# Patient Record
Sex: FEMALE | Race: BLACK OR AFRICAN AMERICAN | NOT HISPANIC OR LATINO | URBAN - METROPOLITAN AREA
[De-identification: names, ages, dates, MRNs, and addresses within clinical notes are randomized per-mention and may not be internally consistent; named-entity substitution may affect disease eponyms.]

---

## 2020-07-13 ENCOUNTER — OFFICE VISIT (OUTPATIENT)
Dept: URBAN - METROPOLITAN AREA CLINIC 27 | Facility: CLINIC | Age: 65
End: 2020-07-13

## 2020-07-13 PROBLEM — 414916001 OBESITY: Status: ACTIVE | Noted: 2020-07-13

## 2020-07-31 ENCOUNTER — OFFICE VISIT (OUTPATIENT)
Dept: URBAN - METROPOLITAN AREA SURGERY CENTER 7 | Facility: SURGERY CENTER | Age: 65
End: 2020-07-31

## 2020-12-07 ENCOUNTER — OFFICE VISIT (OUTPATIENT)
Dept: URBAN - METROPOLITAN AREA CLINIC 27 | Facility: CLINIC | Age: 65
End: 2020-12-07

## 2021-09-28 ENCOUNTER — OFFICE VISIT (OUTPATIENT)
Dept: URBAN - METROPOLITAN AREA CLINIC 27 | Facility: CLINIC | Age: 66
End: 2021-09-28

## 2022-04-30 ENCOUNTER — TELEPHONE ENCOUNTER (OUTPATIENT)
Dept: URBAN - METROPOLITAN AREA CLINIC 121 | Facility: CLINIC | Age: 67
End: 2022-04-30

## 2022-04-30 RX ORDER — DEXLANSOPRAZOLE 60 MG/1
1 CAPSULE PO QD CAPSULE, DELAYED RELEASE ORAL
OUTPATIENT
Start: 2015-11-04

## 2022-04-30 RX ORDER — ATENOLOL 25 MG/1
TABLET ORAL
OUTPATIENT
Start: 2015-08-18

## 2022-04-30 RX ORDER — OMEPRAZOLE 40 MG/1
CAPSULE, DELAYED RELEASE ORAL
OUTPATIENT
Start: 2015-08-18 | End: 2016-02-15

## 2022-04-30 RX ORDER — DEXLANSOPRAZOLE 60 MG/1
1 CAPSULE PO QD CAPSULE, DELAYED RELEASE ORAL
OUTPATIENT
Start: 2015-11-04 | End: 2020-12-07

## 2022-04-30 RX ORDER — OMEPRAZOLE 40 MG/1
CAPSULE, DELAYED RELEASE ORAL
OUTPATIENT
Start: 2015-08-18

## 2022-05-01 ENCOUNTER — TELEPHONE ENCOUNTER (OUTPATIENT)
Dept: URBAN - METROPOLITAN AREA CLINIC 121 | Facility: CLINIC | Age: 67
End: 2022-05-01

## 2022-05-01 RX ORDER — DICYCLOMINE HYDROCHLORIDE 20 MG/1
TAKE 1 TABLET PO Q8H PRN ABD PAIN TABLET ORAL
Status: ACTIVE | COMMUNITY
Start: 2020-01-31

## 2022-05-01 RX ORDER — ATENOLOL 25 MG/1
TABLET ORAL
Status: ACTIVE | COMMUNITY
Start: 2015-08-18

## 2022-05-01 RX ORDER — CURCUMIN 100 %
POWDER (GRAM) MISCELLANEOUS
Status: ACTIVE | COMMUNITY
Start: 2015-08-18

## 2023-05-03 ENCOUNTER — WEB ENCOUNTER (OUTPATIENT)
Dept: URBAN - METROPOLITAN AREA CLINIC 27 | Facility: CLINIC | Age: 68
End: 2023-05-03

## 2023-05-03 ENCOUNTER — OFFICE VISIT (OUTPATIENT)
Dept: URBAN - METROPOLITAN AREA CLINIC 27 | Facility: CLINIC | Age: 68
End: 2023-05-03
Payer: MEDICARE

## 2023-05-03 VITALS
HEART RATE: 53 BPM | BODY MASS INDEX: 32.62 KG/M2 | HEIGHT: 65 IN | WEIGHT: 195.8 LBS | RESPIRATION RATE: 17 BRPM | SYSTOLIC BLOOD PRESSURE: 115 MMHG | DIASTOLIC BLOOD PRESSURE: 68 MMHG

## 2023-05-03 DIAGNOSIS — K21.9 GASTRO-ESOPHAGEAL REFLUX DISEASE WITHOUT ESOPHAGITIS: ICD-10-CM

## 2023-05-03 DIAGNOSIS — R10.32 LEFT LOWER QUADRANT PAIN: ICD-10-CM

## 2023-05-03 PROBLEM — 301716002 LEFT LOWER QUADRANT PAIN: Status: ACTIVE | Noted: 2020-01-31

## 2023-05-03 PROCEDURE — 99213 OFFICE O/P EST LOW 20 MIN: CPT | Performed by: INTERNAL MEDICINE

## 2023-05-03 RX ORDER — DICYCLOMINE HYDROCHLORIDE 20 MG/1
TAKE 1 TABLET PO Q8H PRN ABD PAIN TABLET ORAL
Status: ACTIVE | COMMUNITY
Start: 2020-01-31

## 2023-05-03 RX ORDER — CURCUMIN 100 %
POWDER (GRAM) MISCELLANEOUS
Status: ACTIVE | COMMUNITY
Start: 2015-08-18

## 2023-05-03 RX ORDER — ATENOLOL 25 MG/1
TABLET ORAL
Status: ACTIVE | COMMUNITY
Start: 2015-08-18

## 2023-05-03 NOTE — HPI-TODAY'S VISIT:
68-year-old female here for abdominal pain.  She has had intermittent left lower quadrant pain for several years.  Pain is improved with dicyclomine.  She had an episode of pain about 4 to 6 weeks ago that seemed worse than normal.  Pain was improved with dicyclomine.  Pain resolved about 3 weeks ago.  No fevers or chills.  Bowel movements were irregular during the episode.  Last colonoscopy was 2020 which showed 1 small polyp and diverticulosis of the sigmoid colon.

## 2023-05-23 ENCOUNTER — OFFICE VISIT (OUTPATIENT)
Dept: URBAN - METROPOLITAN AREA CLINIC 27 | Facility: CLINIC | Age: 68
End: 2023-05-23
Payer: MEDICARE

## 2023-05-23 VITALS
SYSTOLIC BLOOD PRESSURE: 135 MMHG | DIASTOLIC BLOOD PRESSURE: 79 MMHG | HEIGHT: 65 IN | HEART RATE: 66 BPM | BODY MASS INDEX: 32.82 KG/M2 | WEIGHT: 197 LBS

## 2023-05-23 DIAGNOSIS — R10.32 LEFT LOWER QUADRANT PAIN: ICD-10-CM

## 2023-05-23 DIAGNOSIS — R10.84 GENERALIZED ABDOMINAL DISCOMFORT: ICD-10-CM

## 2023-05-23 PROCEDURE — 99213 OFFICE O/P EST LOW 20 MIN: CPT | Performed by: INTERNAL MEDICINE

## 2023-05-23 RX ORDER — ATENOLOL 25 MG/1
TABLET ORAL
Status: ACTIVE | COMMUNITY
Start: 2015-08-18

## 2023-05-23 RX ORDER — CURCUMIN 100 %
POWDER (GRAM) MISCELLANEOUS
Status: ACTIVE | COMMUNITY
Start: 2015-08-18

## 2023-05-23 RX ORDER — DICYCLOMINE HYDROCHLORIDE 20 MG/1
TAKE 1 TABLET PO Q8H PRN ABD PAIN TABLET ORAL
Status: ACTIVE | COMMUNITY
Start: 2020-01-31

## 2023-05-23 NOTE — HPI-TODAY'S VISIT:
68-year-old female here for concern for possible H. pylori.  She was noted on recent labs to have low ferritin and B12 per patient, records not currently available.  She does have intermittent bloating and abdominal discomfort.  Denies melena, rectal bleeding, nausea, vomiting.

## 2023-05-24 ENCOUNTER — TELEPHONE ENCOUNTER (OUTPATIENT)
Dept: URBAN - METROPOLITAN AREA CLINIC 27 | Facility: CLINIC | Age: 68
End: 2023-05-24

## 2023-05-26 LAB
H PYLORI BREATH TEST: DETECTED
H. PYLORI BREATH TEST: DETECTED
INTERPRETATION: DETECTED

## 2023-06-05 ENCOUNTER — TELEPHONE ENCOUNTER (OUTPATIENT)
Dept: URBAN - METROPOLITAN AREA CLINIC 27 | Facility: CLINIC | Age: 68
End: 2023-06-05

## 2023-06-06 ENCOUNTER — TELEPHONE ENCOUNTER (OUTPATIENT)
Dept: URBAN - METROPOLITAN AREA CLINIC 27 | Facility: CLINIC | Age: 68
End: 2023-06-06

## 2023-06-06 RX ORDER — OMEPRAZOLE 20 MG/1
1 CAPSULE CAPSULE, DELAYED RELEASE ORAL TWICE DAILY
Qty: 28 | Refills: 0 | OUTPATIENT
Start: 2023-06-08

## 2023-06-06 RX ORDER — METRONIDAZOLE 500 MG/1
1 TABLET TABLET ORAL TWICE A DAY
Qty: 28 TABLET | Refills: 0 | OUTPATIENT
Start: 2023-06-08 | End: 2023-06-22

## 2023-06-06 RX ORDER — CLARITHROMYCIN 500 MG/1
1 TABLET TABLET, FILM COATED ORAL
Qty: 28 TABLET | Refills: 0 | OUTPATIENT
Start: 2023-06-08 | End: 2023-06-22

## 2023-06-12 ENCOUNTER — TELEPHONE ENCOUNTER (OUTPATIENT)
Dept: URBAN - METROPOLITAN AREA CLINIC 27 | Facility: CLINIC | Age: 68
End: 2023-06-12

## 2023-06-12 RX ORDER — DICYCLOMINE HYDROCHLORIDE 20 MG/1
TAKE 1 TABLET TABLET ORAL EVERY 8 HOURS
Qty: 50 | Refills: 2
Start: 2020-01-31 | End: 2023-09-11

## 2023-08-11 ENCOUNTER — WEB ENCOUNTER (OUTPATIENT)
Dept: URBAN - METROPOLITAN AREA CLINIC 27 | Facility: CLINIC | Age: 68
End: 2023-08-11

## 2023-08-14 ENCOUNTER — LAB OUTSIDE AN ENCOUNTER (OUTPATIENT)
Age: 68
End: 2023-08-14

## 2023-08-17 ENCOUNTER — OFFICE VISIT (OUTPATIENT)
Dept: URBAN - METROPOLITAN AREA CLINIC 27 | Facility: CLINIC | Age: 68
End: 2023-08-17

## 2023-08-17 LAB — RESULT:: (no result)

## 2023-08-24 ENCOUNTER — OFFICE VISIT (OUTPATIENT)
Dept: URBAN - METROPOLITAN AREA CLINIC 27 | Facility: CLINIC | Age: 68
End: 2023-08-24
Payer: MEDICARE

## 2023-08-24 VITALS
DIASTOLIC BLOOD PRESSURE: 84 MMHG | BODY MASS INDEX: 32.82 KG/M2 | HEART RATE: 67 BPM | HEIGHT: 65 IN | RESPIRATION RATE: 17 BRPM | WEIGHT: 197 LBS | SYSTOLIC BLOOD PRESSURE: 144 MMHG

## 2023-08-24 DIAGNOSIS — R10.84 GENERALIZED ABDOMINAL DISCOMFORT: ICD-10-CM

## 2023-08-24 PROCEDURE — 91065 BREATH HYDROGEN/METHANE TEST: CPT | Performed by: INTERNAL MEDICINE

## 2023-08-24 PROCEDURE — 99213 OFFICE O/P EST LOW 20 MIN: CPT | Performed by: INTERNAL MEDICINE

## 2023-08-24 RX ORDER — ATENOLOL 25 MG/1
TABLET ORAL
Status: ACTIVE | COMMUNITY
Start: 2015-08-18

## 2023-08-24 RX ORDER — CURCUMIN 100 %
POWDER (GRAM) MISCELLANEOUS
Status: ACTIVE | COMMUNITY
Start: 2015-08-18

## 2023-08-24 RX ORDER — OMEPRAZOLE 20 MG/1
1 CAPSULE CAPSULE, DELAYED RELEASE ORAL TWICE DAILY
Qty: 28 | Refills: 0 | Status: ACTIVE | COMMUNITY
Start: 2023-06-08

## 2023-08-24 RX ORDER — DICYCLOMINE HYDROCHLORIDE 20 MG/1
TAKE 1 TABLET TABLET ORAL EVERY 8 HOURS
Qty: 50 | Refills: 2 | Status: ACTIVE | COMMUNITY
Start: 2020-01-31 | End: 2023-09-11

## 2023-08-24 NOTE — HPI-TODAY'S VISIT:
68-year-old female here for follow-up of abdominal discomfort, H. pylori.  She received triple therapy for H. pylori and symptoms have improved, but not resolved.  Currently denies melena, dysphagia, weight loss.

## 2023-08-26 LAB
OCCULT BLOOD, FECAL, IA: (no result)
TRACKING HOUSE ACCOUNT: (no result)

## 2023-08-28 LAB
H PYLORI BREATH TEST: NOT DETECTED
H. PYLORI BREATH TEST: NOT DETECTED
INTERPRETATION: NOT DETECTED

## 2023-08-30 ENCOUNTER — TELEPHONE ENCOUNTER (OUTPATIENT)
Dept: URBAN - METROPOLITAN AREA CLINIC 27 | Facility: CLINIC | Age: 68
End: 2023-08-30

## 2024-05-02 ENCOUNTER — OFFICE VISIT (OUTPATIENT)
Dept: URBAN - METROPOLITAN AREA CLINIC 27 | Facility: CLINIC | Age: 69
End: 2024-05-02

## 2024-05-09 ENCOUNTER — OFFICE VISIT (OUTPATIENT)
Dept: URBAN - METROPOLITAN AREA CLINIC 27 | Facility: CLINIC | Age: 69
End: 2024-05-09
Payer: MEDICARE

## 2024-05-09 ENCOUNTER — DASHBOARD ENCOUNTERS (OUTPATIENT)
Age: 69
End: 2024-05-09

## 2024-05-09 VITALS
HEART RATE: 52 BPM | HEIGHT: 65 IN | BODY MASS INDEX: 33.99 KG/M2 | SYSTOLIC BLOOD PRESSURE: 134 MMHG | WEIGHT: 204 LBS | DIASTOLIC BLOOD PRESSURE: 73 MMHG

## 2024-05-09 DIAGNOSIS — R10.32 LEFT LOWER QUADRANT PAIN: ICD-10-CM

## 2024-05-09 PROCEDURE — 99213 OFFICE O/P EST LOW 20 MIN: CPT | Performed by: INTERNAL MEDICINE

## 2024-05-09 RX ORDER — OMEPRAZOLE 20 MG/1
1 CAPSULE CAPSULE, DELAYED RELEASE ORAL TWICE DAILY
Qty: 28 | Refills: 0 | Status: ACTIVE | COMMUNITY
Start: 2023-06-08

## 2024-05-09 RX ORDER — CURCUMIN 100 %
POWDER (GRAM) MISCELLANEOUS
Status: ACTIVE | COMMUNITY
Start: 2015-08-18

## 2024-05-09 RX ORDER — ATENOLOL 25 MG/1
TABLET ORAL
Status: ACTIVE | COMMUNITY
Start: 2015-08-18

## 2024-06-19 ENCOUNTER — OFFICE VISIT (OUTPATIENT)
Dept: URBAN - METROPOLITAN AREA CLINIC 27 | Facility: CLINIC | Age: 69
End: 2024-06-19
Payer: MEDICARE

## 2024-06-19 VITALS
DIASTOLIC BLOOD PRESSURE: 74 MMHG | HEIGHT: 65 IN | HEART RATE: 60 BPM | BODY MASS INDEX: 33.32 KG/M2 | WEIGHT: 200 LBS | SYSTOLIC BLOOD PRESSURE: 127 MMHG

## 2024-06-19 DIAGNOSIS — K92.1 BLACK STOOL: ICD-10-CM

## 2024-06-19 PROCEDURE — 99213 OFFICE O/P EST LOW 20 MIN: CPT | Performed by: INTERNAL MEDICINE

## 2024-06-19 RX ORDER — OMEPRAZOLE 20 MG/1
1 CAPSULE CAPSULE, DELAYED RELEASE ORAL TWICE DAILY
Qty: 28 | Refills: 0 | Status: ACTIVE | COMMUNITY
Start: 2023-06-08

## 2024-06-19 RX ORDER — CURCUMIN 100 %
POWDER (GRAM) MISCELLANEOUS
Status: ACTIVE | COMMUNITY
Start: 2015-08-18

## 2024-06-19 RX ORDER — ATENOLOL 25 MG/1
TABLET ORAL
Status: ACTIVE | COMMUNITY
Start: 2015-08-18

## 2024-06-19 NOTE — HPI-TODAY'S VISIT:
69-year-old female here for black bowel movements.  She had black bowel movements starting about a week ago.  Symptoms resolved yesterday after starting a  probiotic.  No abdominal pain.  She is on omeprazole for reflux.  Denies NSAID use.  Denies iron supplementation, Pepto-Bismol.  No change in diet.

## 2024-07-08 ENCOUNTER — TELEPHONE ENCOUNTER (OUTPATIENT)
Dept: URBAN - METROPOLITAN AREA CLINIC 27 | Facility: CLINIC | Age: 69
End: 2024-07-08

## 2024-07-08 RX ORDER — DICYCLOMINE HYDROCHLORIDE 20 MG/1
TAKE 1 TABLET TABLET ORAL EVERY 8 HOURS
Qty: 50 | Refills: 2
Start: 2020-01-31 | End: 2024-10-06

## 2025-05-14 ENCOUNTER — OFFICE VISIT (OUTPATIENT)
Dept: URBAN - METROPOLITAN AREA CLINIC 27 | Facility: CLINIC | Age: 70
End: 2025-05-14
Payer: MEDICARE

## 2025-05-14 ENCOUNTER — LAB OUTSIDE AN ENCOUNTER (OUTPATIENT)
Dept: URBAN - METROPOLITAN AREA CLINIC 27 | Facility: CLINIC | Age: 70
End: 2025-05-14

## 2025-05-14 DIAGNOSIS — R10.32 LEFT LOWER QUADRANT PAIN: ICD-10-CM

## 2025-05-14 DIAGNOSIS — Z86.0100 PERSONAL HISTORY OF COLONIC POLYPS: ICD-10-CM

## 2025-05-14 PROCEDURE — 99213 OFFICE O/P EST LOW 20 MIN: CPT | Performed by: INTERNAL MEDICINE

## 2025-05-14 RX ORDER — DICYCLOMINE HYDROCHLORIDE 10 MG/1
1-2 CAPSULES CAPSULE ORAL
Qty: 90 | Refills: 3 | OUTPATIENT
Start: 2025-05-14

## 2025-05-14 RX ORDER — ATENOLOL 25 MG/1
TABLET ORAL
Status: ACTIVE | COMMUNITY
Start: 2015-08-18

## 2025-05-14 RX ORDER — CURCUMIN 100 %
POWDER (GRAM) MISCELLANEOUS
Status: ACTIVE | COMMUNITY
Start: 2015-08-18

## 2025-05-14 RX ORDER — OMEPRAZOLE 20 MG/1
1 CAPSULE CAPSULE, DELAYED RELEASE ORAL TWICE DAILY
Qty: 28 | Refills: 0 | Status: ACTIVE | COMMUNITY
Start: 2023-06-08

## 2025-05-14 NOTE — HPI-TODAY'S VISIT:
70-year-old female here for left lower quadrant pain.  Pain is ago and was bothersome when initially occurred.  She does have a history of diverticulitis.  Symptoms got better with changing her diet.  Denies fevers or chills.  She has been on dicyclomine for this in the past, but has been off for several years.

## 2025-06-16 ENCOUNTER — OFFICE VISIT (OUTPATIENT)
Dept: URBAN - METROPOLITAN AREA CLINIC 27 | Facility: CLINIC | Age: 70
End: 2025-06-16

## 2025-06-23 ENCOUNTER — OFFICE VISIT (OUTPATIENT)
Dept: URBAN - METROPOLITAN AREA CLINIC 27 | Facility: CLINIC | Age: 70
End: 2025-06-23
Payer: MEDICARE

## 2025-06-23 ENCOUNTER — LAB OUTSIDE AN ENCOUNTER (OUTPATIENT)
Dept: URBAN - METROPOLITAN AREA CLINIC 27 | Facility: CLINIC | Age: 70
End: 2025-06-23

## 2025-06-23 DIAGNOSIS — R10.32 LEFT LOWER QUADRANT PAIN: ICD-10-CM

## 2025-06-23 DIAGNOSIS — Z86.0100 PERSONAL HISTORY OF COLONIC POLYPS: ICD-10-CM

## 2025-06-23 PROCEDURE — 99213 OFFICE O/P EST LOW 20 MIN: CPT | Performed by: INTERNAL MEDICINE

## 2025-06-23 RX ORDER — CURCUMIN 100 %
POWDER (GRAM) MISCELLANEOUS
Status: ACTIVE | COMMUNITY
Start: 2015-08-18

## 2025-06-23 RX ORDER — DICYCLOMINE HYDROCHLORIDE 10 MG/1
1-2 CAPSULES CAPSULE ORAL
Qty: 90 | Refills: 3 | Status: ACTIVE | COMMUNITY
Start: 2025-05-14

## 2025-06-23 RX ORDER — SOLIFENACIN SUCCINATE 5 MG/1
TAKE 1 TABLET BY MOUTH EVERY DAY TABLET, FILM COATED ORAL
Qty: 30 EACH | Refills: 7 | Status: ACTIVE | COMMUNITY

## 2025-06-23 RX ORDER — ATENOLOL 25 MG/1
TABLET ORAL
Status: ACTIVE | COMMUNITY
Start: 2015-08-18

## 2025-06-23 RX ORDER — OMEPRAZOLE 20 MG/1
1 CAPSULE CAPSULE, DELAYED RELEASE ORAL TWICE DAILY
Qty: 28 | Refills: 0 | Status: ACTIVE | COMMUNITY
Start: 2023-06-08

## 2025-06-23 RX ORDER — ERGOCALCIFEROL CAPSULES, 1.25 MG/1
CAPSULE ORAL
Qty: 12 EACH | Refills: 1 | Status: ACTIVE | COMMUNITY

## 2025-06-23 NOTE — HPI-TODAY'S VISIT:
70-year-old female here for follow-up of left lower quadrant abdominal pain, repeat colonoscopy.  Pain has improved with dicyclomine in the past.  Overall, pain has improved lately.  Last colonoscopy was 5 years ago at which time she had 1 tubular adenoma removed. She does have a history of palpitations, currently on atenolol.

## 2025-06-27 ENCOUNTER — TELEPHONE ENCOUNTER (OUTPATIENT)
Dept: URBAN - METROPOLITAN AREA CLINIC 27 | Facility: CLINIC | Age: 70
End: 2025-06-27

## 2025-06-27 RX ORDER — DICYCLOMINE HYDROCHLORIDE 10 MG/1
1-2 CAPSULES CAPSULE ORAL
Qty: 90 | Refills: 3
Start: 2025-05-14

## 2025-07-23 ENCOUNTER — OFFICE VISIT (OUTPATIENT)
Dept: URBAN - METROPOLITAN AREA MEDICAL CENTER 8 | Facility: MEDICAL CENTER | Age: 70
End: 2025-07-23

## 2025-08-07 ENCOUNTER — TELEPHONE ENCOUNTER (OUTPATIENT)
Dept: URBAN - METROPOLITAN AREA CLINIC 27 | Facility: CLINIC | Age: 70
End: 2025-08-07

## 2025-08-27 ENCOUNTER — OFFICE VISIT (OUTPATIENT)
Dept: URBAN - METROPOLITAN AREA MEDICAL CENTER 8 | Facility: MEDICAL CENTER | Age: 70
End: 2025-08-27